# Patient Record
Sex: MALE | Race: WHITE | NOT HISPANIC OR LATINO | Employment: FULL TIME | URBAN - NONMETROPOLITAN AREA
[De-identification: names, ages, dates, MRNs, and addresses within clinical notes are randomized per-mention and may not be internally consistent; named-entity substitution may affect disease eponyms.]

---

## 2018-09-14 ENCOUNTER — HOSPITAL ENCOUNTER (EMERGENCY)
Facility: HOSPITAL | Age: 53
Discharge: HOME | End: 2018-09-14
Attending: EMERGENCY MEDICINE | Admitting: EMERGENCY MEDICINE

## 2018-09-14 ENCOUNTER — APPOINTMENT (OUTPATIENT)
Dept: ULTRASOUND IMAGING | Facility: HOSPITAL | Age: 53
End: 2018-09-14

## 2018-09-14 VITALS
HEART RATE: 83 BPM | TEMPERATURE: 97.7 F | OXYGEN SATURATION: 99 % | BODY MASS INDEX: 42.2 KG/M2 | HEIGHT: 72 IN | RESPIRATION RATE: 16 BRPM | DIASTOLIC BLOOD PRESSURE: 91 MMHG | SYSTOLIC BLOOD PRESSURE: 135 MMHG | WEIGHT: 311.6 LBS

## 2018-09-14 DIAGNOSIS — I80.02 THROMBOPHLEBITIS OF SUPERFICIAL VEINS OF LEFT LOWER EXTREMITY: Primary | ICD-10-CM

## 2018-09-14 LAB
ALBUMIN SERPL-MCNC: 4.3 G/DL (ref 3.5–5)
ALBUMIN/GLOB SERPL: 1.4 G/DL (ref 1–2)
ALP SERPL-CCNC: 99 U/L (ref 38–126)
ALT SERPL W P-5'-P-CCNC: 21 U/L (ref 13–69)
ANION GAP SERPL CALCULATED.3IONS-SCNC: 12.8 MMOL/L (ref 10–20)
AST SERPL-CCNC: 32 U/L (ref 15–46)
BASOPHILS # BLD AUTO: 0.05 10*3/MM3 (ref 0–0.2)
BASOPHILS NFR BLD AUTO: 0.5 % (ref 0–2.5)
BILIRUB SERPL-MCNC: 0.5 MG/DL (ref 0.2–1.3)
BUN BLD-MCNC: 13 MG/DL (ref 7–20)
BUN/CREAT SERPL: 10.8 (ref 6.3–21.9)
CALCIUM SPEC-SCNC: 9.2 MG/DL (ref 8.4–10.2)
CHLORIDE SERPL-SCNC: 104 MMOL/L (ref 98–107)
CO2 SERPL-SCNC: 27 MMOL/L (ref 26–30)
CREAT BLD-MCNC: 1.2 MG/DL (ref 0.6–1.3)
CRP SERPL-MCNC: <0.5 MG/DL (ref 0–1)
DEPRECATED RDW RBC AUTO: 41.5 FL (ref 37–54)
EOSINOPHIL # BLD AUTO: 0.25 10*3/MM3 (ref 0–0.7)
EOSINOPHIL NFR BLD AUTO: 2.3 % (ref 0–7)
ERYTHROCYTE [DISTWIDTH] IN BLOOD BY AUTOMATED COUNT: 13.3 % (ref 11.5–14.5)
ERYTHROCYTE [SEDIMENTATION RATE] IN BLOOD: 3 MM/HR (ref 0–15)
GFR SERPL CREATININE-BSD FRML MDRD: 64 ML/MIN/1.73
GLOBULIN UR ELPH-MCNC: 3 GM/DL
GLUCOSE BLD-MCNC: 98 MG/DL (ref 74–98)
HCT VFR BLD AUTO: 44.2 % (ref 42–52)
HGB BLD-MCNC: 14.7 G/DL (ref 14–18)
IMM GRANULOCYTES # BLD: 0.03 10*3/MM3 (ref 0–0.06)
IMM GRANULOCYTES NFR BLD: 0.3 % (ref 0–0.6)
LYMPHOCYTES # BLD AUTO: 3.13 10*3/MM3 (ref 0.6–3.4)
LYMPHOCYTES NFR BLD AUTO: 28.4 % (ref 10–50)
MCH RBC QN AUTO: 28.4 PG (ref 27–31)
MCHC RBC AUTO-ENTMCNC: 33.3 G/DL (ref 30–37)
MCV RBC AUTO: 85.5 FL (ref 80–94)
MONOCYTES # BLD AUTO: 0.66 10*3/MM3 (ref 0–0.9)
MONOCYTES NFR BLD AUTO: 6 % (ref 0–12)
NEUTROPHILS # BLD AUTO: 6.91 10*3/MM3 (ref 2–6.9)
NEUTROPHILS NFR BLD AUTO: 62.5 % (ref 37–80)
NRBC BLD MANUAL-RTO: 0 /100 WBC (ref 0–0)
PLATELET # BLD AUTO: 271 10*3/MM3 (ref 130–400)
PMV BLD AUTO: 9.7 FL (ref 6–12)
POTASSIUM BLD-SCNC: 3.8 MMOL/L (ref 3.5–5.1)
PROT SERPL-MCNC: 7.3 G/DL (ref 6.3–8.2)
RBC # BLD AUTO: 5.17 10*6/MM3 (ref 4.7–6.1)
SODIUM BLD-SCNC: 140 MMOL/L (ref 137–145)
WBC NRBC COR # BLD: 11.03 10*3/MM3 (ref 4.8–10.8)

## 2018-09-14 PROCEDURE — 99283 EMERGENCY DEPT VISIT LOW MDM: CPT

## 2018-09-14 PROCEDURE — 85025 COMPLETE CBC W/AUTO DIFF WBC: CPT | Performed by: PHYSICIAN ASSISTANT

## 2018-09-14 PROCEDURE — 93971 EXTREMITY STUDY: CPT

## 2018-09-14 PROCEDURE — 86140 C-REACTIVE PROTEIN: CPT | Performed by: PHYSICIAN ASSISTANT

## 2018-09-14 PROCEDURE — 85651 RBC SED RATE NONAUTOMATED: CPT | Performed by: PHYSICIAN ASSISTANT

## 2018-09-14 PROCEDURE — 80053 COMPREHEN METABOLIC PANEL: CPT | Performed by: PHYSICIAN ASSISTANT

## 2018-09-14 RX ORDER — IBUPROFEN 600 MG/1
600 TABLET ORAL EVERY 8 HOURS PRN
Qty: 60 TABLET | Refills: 0 | Status: SHIPPED | OUTPATIENT
Start: 2018-09-14

## 2018-09-14 NOTE — ED PROVIDER NOTES
"Subjective   This is a 54 y/o male that comes in with c/c \"Left leg pain\" X 3-4 days. Patient states that he has had painful left calf. Denies any injury. No history of blood clots. Patient is otherwise healty. Denies any other acute findings.         History provided by:  Patient   used: No    Lower Extremity Issue   Location:  Leg  Time since incident:  4 days  Injury: no    Leg location:  L leg  Pain details:     Quality:  Aching, shooting and throbbing    Radiates to:  Does not radiate    Severity:  Moderate    Onset quality:  Sudden    Duration:  4 days    Timing:  Intermittent    Progression:  Worsening  Chronicity:  New  Dislocation: no    Foreign body present:  No foreign bodies  Tetanus status:  Unknown  Prior injury to area:  No  Relieved by:  Nothing  Worsened by:  Nothing  Ineffective treatments:  None tried  Associated symptoms: decreased ROM, stiffness and swelling    Associated symptoms: no back pain and no itching    Risk factors: no concern for non-accidental trauma, no frequent fractures, no obesity and no recent illness        Review of Systems   Respiratory: Negative for apnea, cough, choking and shortness of breath.    Gastrointestinal: Negative for abdominal distention, anal bleeding and blood in stool.   Genitourinary: Negative for difficulty urinating, dysuria, enuresis, flank pain, frequency and hematuria.   Musculoskeletal: Positive for arthralgias, joint swelling, myalgias and stiffness. Negative for back pain.   Skin: Negative for color change, itching and pallor.   All other systems reviewed and are negative.      Past Medical History:   Diagnosis Date   • Cancer (CMS/HCC)     testicular       No Known Allergies    Past Surgical History:   Procedure Laterality Date   • ACHILLES TENDON REPAIR Bilateral    • ORCHIECTOMY Left        History reviewed. No pertinent family history.    Social History     Social History   • Marital status:      Social History Main " Topics   • Smoking status: Never Smoker   • Smokeless tobacco: Never Used   • Alcohol use Yes   • Drug use: No     Other Topics Concern   • Not on file           Objective   Physical Exam   Constitutional: He is oriented to person, place, and time. He appears well-developed and well-nourished. No distress.   HENT:   Head: Normocephalic and atraumatic.   Right Ear: External ear normal.   Left Ear: External ear normal.   Nose: Nose normal.   Mouth/Throat: Oropharynx is clear and moist. No oropharyngeal exudate.   Eyes: Pupils are equal, round, and reactive to light. Conjunctivae and EOM are normal. Right eye exhibits no discharge. Left eye exhibits no discharge. No scleral icterus.   Neck: Normal range of motion. Neck supple. No JVD present. No tracheal deviation present. No thyromegaly present.   Cardiovascular: Normal rate, regular rhythm, normal heart sounds and intact distal pulses.  Exam reveals no friction rub.    No murmur heard.  Pulmonary/Chest: Effort normal and breath sounds normal. No stridor. No respiratory distress. He has no wheezes. He has no rales.   Abdominal: Soft. Bowel sounds are normal. He exhibits no distension and no mass. There is no tenderness. There is no rebound and no guarding.   Musculoskeletal: Normal range of motion. He exhibits edema and tenderness.        Left lower leg: He exhibits tenderness, swelling and edema.   Small palpable cord to left leg. Mild tenderness.    Lymphadenopathy:     He has no cervical adenopathy.   Neurological: He is alert and oriented to person, place, and time. He displays normal reflexes. No cranial nerve deficit. He exhibits normal muscle tone. Coordination normal.   Skin: Skin is warm and dry. Capillary refill takes less than 2 seconds. No rash noted. He is not diaphoretic. No erythema. No pallor.   Psychiatric: He has a normal mood and affect. His behavior is normal. Judgment and thought content normal.   Nursing note and vitals  reviewed.      Procedures           ED Course  ED Course as of Sep 14 1946   Fri Sep 14, 2018   1937 No evidence of DVT. There is superficial thrombophlebitis in the  area of the patient's pain located in the proximal medial left  calf.  []      ED Course User Index  [] Dereck Garcia PA-C                  [facility]      Final diagnoses:   Thrombophlebitis of superficial veins of left lower extremity            Dereck Garcia PA-C  09/14/18 1946

## 2018-09-14 NOTE — DISCHARGE INSTRUCTIONS
Use of a warm compress or heating pad.  Use of compression stockings or bandages.  Anti-inflammatory medicines.

## 2021-01-22 ENCOUNTER — IMMUNIZATION (OUTPATIENT)
Dept: VACCINE CLINIC | Facility: HOSPITAL | Age: 56
End: 2021-01-22

## 2021-01-22 PROCEDURE — 91300 HC SARSCOV02 VAC 30MCG/0.3ML IM: CPT | Performed by: INTERNAL MEDICINE

## 2021-01-22 PROCEDURE — 0001A: CPT | Performed by: INTERNAL MEDICINE

## 2021-02-12 ENCOUNTER — IMMUNIZATION (OUTPATIENT)
Dept: VACCINE CLINIC | Facility: HOSPITAL | Age: 56
End: 2021-02-12

## 2021-02-12 PROCEDURE — 91300 HC SARSCOV02 VAC 30MCG/0.3ML IM: CPT | Performed by: INTERNAL MEDICINE

## 2021-02-12 PROCEDURE — 0002A: CPT | Performed by: INTERNAL MEDICINE

## 2023-02-22 ENCOUNTER — OFFICE VISIT (OUTPATIENT)
Dept: CARDIOLOGY | Facility: CLINIC | Age: 58
End: 2023-02-22
Payer: COMMERCIAL

## 2023-02-22 VITALS
HEART RATE: 88 BPM | OXYGEN SATURATION: 97 % | SYSTOLIC BLOOD PRESSURE: 122 MMHG | WEIGHT: 308 LBS | BODY MASS INDEX: 41.77 KG/M2 | DIASTOLIC BLOOD PRESSURE: 78 MMHG

## 2023-02-22 DIAGNOSIS — E66.2 CLASS 1 OBESITY WITH ALVEOLAR HYPOVENTILATION, SERIOUS COMORBIDITY, AND BODY MASS INDEX (BMI) OF 32.0 TO 32.9 IN ADULT: ICD-10-CM

## 2023-02-22 DIAGNOSIS — I87.8 CHRONIC VENOUS STASIS: ICD-10-CM

## 2023-02-22 DIAGNOSIS — R07.2 PRECORDIAL PAIN: Primary | ICD-10-CM

## 2023-02-22 PROCEDURE — 93000 ELECTROCARDIOGRAM COMPLETE: CPT | Performed by: INTERNAL MEDICINE

## 2023-02-22 PROCEDURE — 99203 OFFICE O/P NEW LOW 30 MIN: CPT | Performed by: INTERNAL MEDICINE

## 2023-02-22 RX ORDER — DIPHENOXYLATE HYDROCHLORIDE AND ATROPINE SULFATE 2.5; .025 MG/1; MG/1
TABLET ORAL DAILY
COMMUNITY

## 2023-02-22 NOTE — PROGRESS NOTES
"    Subjective:     Encounter Date:02/22/2023      Patient ID: Charlie López is a 57 y.o. male.    Chief Complaint: Chest pain  HPI  This is a 57-year-old male patient who presents to cardiology clinic for evaluation of chest discomfort.  The patient describes a diffuse anterior precordial chest discomfort described as a tightness.  The discomfort occurred approximately 2 months ago.  He will occasionally have an aching pain up and down his left arm.  The discomfort lasted for approximately 2 days.  It had a 7/10 intensity.  It was associated with shortness of breath but no nausea vomiting or diaphoresis.  The patient did not seek medical attention.  The discomfort has not recurred since that initial episode.  He has no personal history of myocardial infarction or coronary revascularization.  He has never had an ischemic evaluation.  He is a non-smoker.  He has no personal history of hypertension diabetes or dyslipidemia.  The patient has noticed increased swelling of his feet and ankles at the end of the day after he is walked or been on his feet for prolonged periods of time related to his occupation as a .  He indicates that the swelling \"goes down\" overnight as his legs are elevated.  He has no personal history of DVT or PE.  The following portions of the patient's history were reviewed and updated as appropriate: allergies, current medications, past family history, past medical history, past social history, past surgical history and problem  Review of Systems   Constitutional: Negative for chills, diaphoresis, fever, malaise/fatigue, weight gain and weight loss.   HENT: Negative for ear discharge, hearing loss, hoarse voice and nosebleeds.    Eyes: Negative for discharge, double vision, pain and photophobia.   Cardiovascular: Positive for chest pain and leg swelling. Negative for claudication, cyanosis, dyspnea on exertion, irregular heartbeat, near-syncope, orthopnea, palpitations, " paroxysmal nocturnal dyspnea and syncope.   Respiratory: Negative for cough, hemoptysis, shortness of breath, sputum production and wheezing.    Endocrine: Negative for cold intolerance, heat intolerance, polydipsia, polyphagia and polyuria.   Hematologic/Lymphatic: Negative for adenopathy and bleeding problem. Does not bruise/bleed easily.   Skin: Negative for color change, flushing, itching and rash.   Musculoskeletal: Negative for muscle cramps, muscle weakness, myalgias and stiffness.   Gastrointestinal: Negative for abdominal pain, diarrhea, hematemesis, hematochezia, nausea and vomiting.   Genitourinary: Negative for dysuria, frequency and nocturia.   Neurological: Negative for focal weakness, loss of balance, numbness, paresthesias and seizures.   Psychiatric/Behavioral: Negative for altered mental status, hallucinations and suicidal ideas.   Allergic/Immunologic: Negative for HIV exposure, hives and persistent infections.           Current Outpatient Medications:   •  ASPIRIN PO, Take  by mouth., Disp: , Rfl:   •  ibuprofen (ADVIL,MOTRIN) 600 MG tablet, Take 1 tablet by mouth Every 8 (Eight) Hours As Needed for Mild Pain ., Disp: 60 tablet, Rfl: 0  •  multivitamin (MULTIVITAMIN PO), Take  by mouth Daily., Disp: , Rfl:   •  Elastic Bandages & Supports (JOBST FOR MEN KNEE HIGH/LG) misc, 2 Units Daily., Disp: 2 each, Rfl: 3    Objective:   Vitals and nursing note reviewed.   Constitutional:       Appearance: Healthy appearance. Not in distress.   Neck:      Vascular: No JVR. JVD normal.   Pulmonary:      Effort: Pulmonary effort is normal.      Breath sounds: Normal breath sounds. No wheezing. No rhonchi. No rales.   Chest:      Chest wall: Not tender to palpatation.   Cardiovascular:      PMI at left midclavicular line. Normal rate. Regular rhythm. Normal S1. Normal S2.      Murmurs: There is no murmur.      No gallop. No click. No rub.   Pulses:     Intact distal pulses.   Edema:     Peripheral edema absent.    Abdominal:      General: Bowel sounds are normal.      Palpations: Abdomen is soft.      Tenderness: There is no abdominal tenderness.   Musculoskeletal: Normal range of motion.         General: No tenderness. Skin:     General: Skin is warm and dry.      Comments: Typical venous stasis dermopathy.  Multiple bilateral superficial and deep varicose vein   Neurological:      General: No focal deficit present.      Mental Status: Alert and oriented to person, place and time.       Blood pressure 122/78, pulse 88, weight (!) 140 kg (308 lb), SpO2 97 %.   Lab Review:     Assessment:       1. Precordial pain  Atypical chest pain.  Multiple risk factors for coronary artery disease.  The patient has never had an ischemic evaluation.  - Treadmill Stress Test    2. Class 1 obesity with  serious comorbidity, and body mass index (BMI) of 32.0 to 32.9 in adult (HCC)      3. Chronic venous stasis        ECG 12 Lead    Date/Time: 2/22/2023 2:10 PM  Performed by: Moo Wyman MD  Authorized by: Moo Wyman MD   Previous ECG: no previous ECG available  Rhythm: sinus rhythm  Rate: normal  QRS axis: normal    Clinical impression: normal ECG            Plan:     I have recommended treadmill exercise stress test.    I have recommended Jobst stockings at 20-30 mm compression above the knee model.    The patient has been counseled extensively regarding the need for dietary sodium restriction.    No changes in medications have been made at today's visit.    The patient has been counseled regarding the essential need for diet exercise and weight management.    Further recommendations will be predicated on the results of his outpatient testing.

## 2023-03-03 LAB
BH CV STRESS BP STAGE 1: NORMAL
BH CV STRESS BP STAGE 2: NORMAL
BH CV STRESS DURATION MIN STAGE 1: 3
BH CV STRESS DURATION MIN STAGE 2: 3
BH CV STRESS DURATION MIN STAGE 3: 3
BH CV STRESS DURATION SEC STAGE 1: 0
BH CV STRESS DURATION SEC STAGE 2: 0
BH CV STRESS DURATION SEC STAGE 3: 0
BH CV STRESS GRADE STAGE 1: 10
BH CV STRESS GRADE STAGE 2: 12
BH CV STRESS GRADE STAGE 3: 14
BH CV STRESS HR STAGE 1: 106
BH CV STRESS HR STAGE 2: 142
BH CV STRESS HR STAGE 3: 143
BH CV STRESS METS STAGE 1: 5
BH CV STRESS METS STAGE 2: 7.5
BH CV STRESS METS STAGE 3: 10
BH CV STRESS O2 STAGE 1: 94
BH CV STRESS O2 STAGE 2: 91
BH CV STRESS O2 STAGE 3: 91
BH CV STRESS PROTOCOL 1: NORMAL
BH CV STRESS RECOVERY BP: NORMAL MMHG
BH CV STRESS RECOVERY HR: 96 BPM
BH CV STRESS RECOVERY O2: 98 %
BH CV STRESS SPEED STAGE 1: 1.7
BH CV STRESS SPEED STAGE 2: 2.5
BH CV STRESS SPEED STAGE 3: 3.4
BH CV STRESS STAGE 1: 1
BH CV STRESS STAGE 2: 2
BH CV STRESS STAGE 3: 3
MAXIMAL PREDICTED HEART RATE: 164 BPM
PERCENT MAX PREDICTED HR: 87.2 %
STRESS BASELINE BP: NORMAL MMHG
STRESS BASELINE HR: 81 BPM
STRESS O2 SAT REST: 97 %
STRESS PERCENT HR: 103 %
STRESS POST ESTIMATED WORKLOAD: 10.1 METS
STRESS POST EXERCISE DUR MIN: 7 MIN
STRESS POST EXERCISE DUR SEC: 12 SEC
STRESS POST O2 SAT PEAK: 91 %
STRESS POST PEAK BP: NORMAL MMHG
STRESS POST PEAK HR: 143 BPM
STRESS TARGET HR: 139 BPM

## 2023-03-21 NOTE — PROGRESS NOTES
[facility] Cardiology Office Follow Up Note    Charlie López  [id number]  2023    Primary Care Provider: Mery Mcginnis PA   Referring Provider: No ref. provider found    Chief Complaint: 4wk F/U CP    History of Present Illness:   Mr. Charlie López is a 57 y.o. male who presents to the Cardiology Clinic for follow-up of chest pain.  The patient previously described chest tightness that occurred approximately 3 months ago.  He subsequently underwent a treadmill stress test which showed no evidence of ischemia.  He denies any recurrence of chest discomfort.  He denies dyspnea, palpitations, dizziness, or syncope.  He denies orthopnea, PND, and lower extremity edema.  He offers no other complaints or concerns at this time.      Past Cardiac Testin. Last Coronary Angio: None    2. Prior Stress Testing: 3/1/2023  •  No ECG evidence of myocardial ischemia.  •  Negative clinical evidence of myocardial ischemia.  •  Findings consistent with a normal ECG stress test.     Normal treadmill exercise stress test    3. Last Echo: None    4. Prior Holter Monitor: None    Review of Systems:   Review of Systems  As Noted in HPI.   I have reviewed and confirmed the accuracy of the ROS as documented by the MA/LPN/RN MARGARETTE Almonte    I have reviewed and/or updated the patient's past medical, past surgical, family, social history, problem list and allergies as appropriate.     Medications:     Current Outpatient Medications:   •  ASPIRIN PO, Take  by mouth., Disp: , Rfl:   •  Elastic Bandages & Supports (JOBST FOR MEN KNEE HIGH/LG) misc, 2 Units Daily., Disp: 2 each, Rfl: 3  •  ibuprofen (ADVIL,MOTRIN) 600 MG tablet, Take 1 tablet by mouth Every 8 (Eight) Hours As Needed for Mild Pain ., Disp: 60 tablet, Rfl: 0  •  multivitamin (THERAGRAN) tablet tablet, Take  by mouth Daily., Disp: , Rfl:     Physical Exam:  Vital Signs:   Vitals:    23 1339   BP: 122/80   Pulse: 97  "  Temp: 97.8 °F (36.6 °C)   SpO2: 95%   Weight: (!) 142 kg (314 lb)   Height: 182.9 cm (72\")     Body mass index is 42.59 kg/m².    Physical Exam  Vitals and nursing note reviewed.   Constitutional:       General: He is not in acute distress.     Appearance: He is morbidly obese.   HENT:      Head: Normocephalic and atraumatic.   Neck:      Trachea: Trachea normal.   Cardiovascular:      Rate and Rhythm: Normal rate and regular rhythm.      Pulses: Normal pulses.      Heart sounds: Normal heart sounds. No murmur heard.    No friction rub. No gallop.   Pulmonary:      Effort: Pulmonary effort is normal.      Breath sounds: Normal breath sounds.   Musculoskeletal:      Cervical back: Neck supple.      Right lower leg: No edema.      Left lower leg: No edema.   Skin:     General: Skin is warm and dry.   Neurological:      Mental Status: He is alert and oriented to person, place, and time.   Psychiatric:         Mood and Affect: Mood normal.         Behavior: Behavior normal. Behavior is cooperative.         Thought Content: Thought content does not include suicidal ideation.         Results Review:   I reviewed the patient's new clinical results.    Procedures    Assessment / Plan:   Diagnoses and all orders for this visit:    1. Precordial pain (Primary)  3/23, Normal treadmill stress test  Encouraged minimizing risk factors for CAD via weight loss, management of blood pressure, blood glucose, and cholesterol  Refer back to PCP to explore noncardiac etiologies of chest discomfort    2. Chronic venous stasis  Encouraged conservative measures such as weight loss, low-sodium diet, and elevation of legs above the level of the heart    3. Class 3 severe obesity due to excess calories with body mass index (BMI) of 40.0 to 44.9 in adult  Encouraged weight loss through diet and exercise      Preventative Cardiology:   Tobacco Cessation: N/A   Advance Care Planning: ACP discussion was declined by the patient. Patient does not " have an advance directive, declines further assistance.     Follow Up:   Return if symptoms worsen or fail to improve.      Thank you for allowing me to participate in the care of your patient. Please to not hesitate to contact me with additional questions or concerns.     MARGARETTE Rodgers

## 2023-03-28 ENCOUNTER — OFFICE VISIT (OUTPATIENT)
Dept: CARDIOLOGY | Facility: CLINIC | Age: 58
End: 2023-03-28
Payer: COMMERCIAL

## 2023-03-28 VITALS
SYSTOLIC BLOOD PRESSURE: 122 MMHG | HEIGHT: 72 IN | DIASTOLIC BLOOD PRESSURE: 80 MMHG | BODY MASS INDEX: 42.53 KG/M2 | HEART RATE: 97 BPM | WEIGHT: 314 LBS | TEMPERATURE: 97.8 F | OXYGEN SATURATION: 95 %

## 2023-03-28 DIAGNOSIS — E66.01 CLASS 3 SEVERE OBESITY DUE TO EXCESS CALORIES WITH SERIOUS COMORBIDITY AND BODY MASS INDEX (BMI) OF 40.0 TO 44.9 IN ADULT: ICD-10-CM

## 2023-03-28 DIAGNOSIS — R07.2 PRECORDIAL PAIN: Primary | ICD-10-CM

## 2023-03-28 DIAGNOSIS — I87.8 CHRONIC VENOUS STASIS: ICD-10-CM

## 2023-03-28 PROCEDURE — 99213 OFFICE O/P EST LOW 20 MIN: CPT | Performed by: NURSE PRACTITIONER

## 2023-04-13 PROBLEM — E66.01 CLASS 3 SEVERE OBESITY DUE TO EXCESS CALORIES WITH BODY MASS INDEX (BMI) OF 40.0 TO 44.9 IN ADULT: Status: ACTIVE | Noted: 2023-02-22
